# Patient Record
Sex: MALE | Race: WHITE | NOT HISPANIC OR LATINO | Employment: FULL TIME | URBAN - METROPOLITAN AREA
[De-identification: names, ages, dates, MRNs, and addresses within clinical notes are randomized per-mention and may not be internally consistent; named-entity substitution may affect disease eponyms.]

---

## 2018-01-17 ENCOUNTER — TELEPHONE (OUTPATIENT)
Dept: FAMILY MEDICINE | Facility: CLINIC | Age: 33
End: 2018-01-17

## 2018-01-17 DIAGNOSIS — Z20.828 EXPOSURE TO INFLUENZA: Primary | ICD-10-CM

## 2018-01-17 RX ORDER — OSELTAMIVIR PHOSPHATE 75 MG/1
75 CAPSULE ORAL 2 TIMES DAILY
Qty: 10 CAPSULE | Refills: 0 | Status: SHIPPED | OUTPATIENT
Start: 2018-01-17 | End: 2019-11-06

## 2018-01-17 NOTE — TELEPHONE ENCOUNTER
Ray's wife called to say that their son was diagnosed with influenza and is on tamiflu.  Ray is coming down with the same symptoms.  Wondering if he can have the tamiful, or if he needs to be seen.  He has not been seen here since 8/2016. - please advise    Pending Prescriptions:                       Disp   Refills    oseltamivir (TAMIFLU) 75 MG capsule       10 cap*0            Sig: Take 1 capsule (75 mg) by mouth 2 times daily    Patient's phone #[phone] wife Kelsey

## 2018-01-17 NOTE — TELEPHONE ENCOUNTER
I will OK this but pt should be aware we are making an exception as he has not been seen in over a year -needs OV for any further cares from this clinic in future

## 2018-06-21 ENCOUNTER — OFFICE VISIT (OUTPATIENT)
Dept: UROLOGY | Facility: CLINIC | Age: 33
End: 2018-06-21
Payer: COMMERCIAL

## 2018-06-21 VITALS
SYSTOLIC BLOOD PRESSURE: 110 MMHG | HEART RATE: 103 BPM | OXYGEN SATURATION: 98 % | WEIGHT: 132 LBS | HEIGHT: 66 IN | DIASTOLIC BLOOD PRESSURE: 76 MMHG | BODY MASS INDEX: 21.21 KG/M2

## 2018-06-21 DIAGNOSIS — Z30.2 ENCOUNTER FOR STERILIZATION: Primary | ICD-10-CM

## 2018-06-21 PROCEDURE — 99203 OFFICE O/P NEW LOW 30 MIN: CPT | Performed by: UROLOGY

## 2018-06-21 RX ORDER — INFLUENZA A VIRUS A/NEBRASKA/14/2019 (H1N1) ANTIGEN (MDCK CELL DERIVED, PROPIOLACTONE INACTIVATED), INFLUENZA A VIRUS A/DELAWARE/39/2019 (H3N2) ANTIGEN (MDCK CELL DERIVED, PROPIOLACTONE INACTIVATED), INFLUENZA B VIRUS B/SINGAPORE/INFTT-16-0610/2016 ANTIGEN (MDCK CELL DERIVED, PROPIOLACTONE INACTIVATED), INFLUENZA B VIRUS B/DARWIN/7/2019 ANTIGEN (MDCK CELL DERIVED, PROPIOLACTONE INACTIVATED) 15; 15; 15; 15 UG/.5ML; UG/.5ML; UG/.5ML; UG/.5ML
INJECTION, SUSPENSION INTRAMUSCULAR
Refills: 0 | COMMUNITY
Start: 2017-10-29 | End: 2019-11-06

## 2018-06-21 RX ORDER — NAPROXEN SODIUM 550 MG/1
TABLET ORAL
Refills: 6 | COMMUNITY
Start: 2017-07-11 | End: 2019-11-06

## 2018-06-21 RX ORDER — NORTRIPTYLINE HYDROCHLORIDE 50 MG/1
CAPSULE ORAL
Refills: 4 | COMMUNITY
Start: 2018-05-11

## 2018-06-21 ASSESSMENT — PAIN SCALES - GENERAL: PAINLEVEL: NO PAIN (0)

## 2018-06-21 NOTE — LETTER
2018       RE: Ray Ingram  [address] [facility] [address]     Dear Colleague,    Thank you for referring your patient, Ray Ingram, to the [facility] at [facility]. Please see a copy of my visit note below.    VASECTOMY CONSULTATION NOTE  [facility]  [phone]  DATE OF VISIT: 2018    PATIENT NAME: Ray Ingram    YOB: 1985      REASON FOR CONSULTATION: Mr. Ray Ingram is a 32 year old year old gentleman who came to the urology clinic today requesting a vasectomy. He has 2 children and he wishes to have a vasectomy for birth control.     PAST MEDICAL HISTORY:   Past Medical History:   Diagnosis Date     NO ACTIVE PROBLEMS        PAST SURGICAL HISTORY:   Past Surgical History:   Procedure Laterality Date     HC TOOTH EXTRACTION W/FORCEP  18    wisdom       MEDICATIONS:   Current Outpatient Prescriptions:      FLUCELVAX QUADRIVALENT 0.5 ML RICKI, ADM 0.5ML IM UTD, Disp: , Rfl: 0     naproxen sodium (ANAPROX) 550 MG tablet, TAKE 1 TABLET BY MOUTH EVERY 12 HOURS AS NEEDED, Disp: , Rfl: 6     nortriptyline (PAMELOR) 50 MG capsule, TAKE 1 CAPSULE BY MOUTH EVERYDAY AT BEDTIME, Disp: , Rfl: 4     oseltamivir (TAMIFLU) 75 MG capsule, Take 1 capsule (75 mg) by mouth 2 times daily (Patient not taking: Reported on 2018), Disp: 10 capsule, Rfl: 0    ALLERGIES:   Allergies   Allergen Reactions     Codeine Sulfate Shortness Of Breath, Nausea and GI Disturbance     Stomach cramps       FAMILY HISTORY:   Family History   Problem Relation Age of Onset     Cancer Father 52     Pancreatic cancer     Alzheimer Disease Maternal Grandmother 60      age 70     Diabetes No family hx of      HEART DISEASE No family hx of        SOCIAL HISTORY:   Social History     Social History     Marital status:      Spouse name: N/A     Number of children: 0     Years of  "education: N/A     Occupational History       [employer]      [employer]     Social History Main Topics     Smoking status: Never Smoker     Smokeless tobacco: Never Used     Alcohol use 0.5 oz/week     1 Standard drinks or equivalent per week      Comment: social     Drug use: No     Sexual activity: Yes     Partners: Female     Birth control/ protection: Condom, Pill     Other Topics Concern      Service No     Blood Transfusions No     Occupational Exposure No     Hobby Hazards No     Sleep Concern No     Weight Concern No     Special Diet No     Exercise Yes     running     Seat Belt Yes     Self-Exams Yes     Social History Narrative       HEIGHT: 5' 6\"     WEIGHT: 132 lbs 0 oz   BP: 110/76    PULSE: 103    EXAM: He is alert and oriented and well-appearing.  Examination of the scrotum reveals normal scrotal skin.  The testicles are normal to palpation bilaterally with no intratesticular lesions.  He has normally palpable vasa bilaterally.    DIAGNOSIS: Request for sterilization    PLAN: The risks of the procedure as well as expectations for recovery and outcomes were splint in detail to him.  He was counseled on the risks for bleeding infection and pain after the procedure.  He was instructed to continue to use contraception until he had proven azoospermia on a semen specimen.  This would normally be collected at least 3 months after the procedure.  He was instructed to hold all anticoagulants medications for one week prior to the procedure.  He was also instructed to shave the scrotum prior to procedure.  It was recommended that he have someone else drive him home after his vasectomy.  In light of these risks and expectations he would like to proceed.  We are scheduling a vasectomy in the office in the near future.    Rom Griffin M.D.      "

## 2018-06-21 NOTE — NURSING NOTE
Chief Complaint   Patient presents with     Clinic Care Coordination - Follow-up     Pt here for vasectomy consult     Kiersten Pedraza CMA

## 2018-06-21 NOTE — MR AVS SNAPSHOT
After Visit Summary   6/21/2018    Ray Ingram    MRN: [id number]           Patient Information     Date Of Birth          1985        Visit Information        Provider Department      6/21/2018 9:30 AM Rom Griffin MD [facility] [place]        Today's Diagnoses     Encounter for sterilization    -  1      Care Instructions    [facility]  Vasectomy Information  [phone]    DATE OF PROCEDURE _____07/13/18______________________________    TIME OF PROCEDURE ____1030am______________________    TIME TO REPORT FOR PROCEDURE _____1015am__________________________    Your Physician:  _____ Dory Mancini M.D.     _____ Camilo Vigil M.D.     _x____ Rom Griffin M.D.    LOCATION OF PROCEDURE:     _____ [facility]  __x___ [address]   [address]   [address]    [place]   [address]    [place]   [address]    Preoperative Instructions:    __x      You may have breakfast on the morning of your procedure.  If your procedure is    in the afternoon, you may have lunch as well.    __x___ You must have someone drive you home after the procedure if you have been    prescribed an oral sedative (valium).    __x___ Do not take any aspirin, blood-thinning or anti-inflammatory medication for at    least 7-10 days before the procedure (this includes but is not limited to Advil,   Aleve, Ecotrin and Bufferin).      Postoperative Instructions Follow Vasectomy    Under routine circumstances, please note the following:    -No heavy lifting (over 15 lbs) for 48 hour.  -You may shower after 48 hours.  You may have a tub bath or use a swimming pool after one week postoperatively.  Your doctor will advise you if he feels it is helpful to soak in a bathtub postoperatively.  -Do not engage in intercourse for at least ten days and then proceed when comfortable.  -Wear an athletic supporter for  "48 hours postoperatively or until any discomfort ceases.  -Your physician will instruct you regarding the use of ice in the recovery room or at home after the procedure, as necessary.  -Please remember as you resume your activity that you may experience some discomfor and/or swelling for the one to two weeks following the procedure.  If this occurs decrease activity and slightly elevate the scrotum (athletic supporter).  -As your stitches dissolve it may appear that the incision is \"gaping.\"  This is normal.    Necessary follow-up:  You do not need a follow up appointment unless you have problems after your procedure.  Please call our office at [phone] if you do.    At the time of your procedure you will be given the supplies for your post procedure follow up.  The test should be done AT LEAST THREE MONTHS AFTER your vasecomy.  During this time, be certain to maintain birth control measure!    Between the time of your procedure and the time that you have your first sperm count it is very important that you have a minimum of 30 ejaculations.  If you have any questions about this, please consult your physician.    If the sperm count that is done at three months is negative, you will be considered sterile.  Until, you are told that you are free to discontinue birth control you are considered fertile.    If your sperm counts reveal the presence of sperm, you will be advised to repeat the test until a negative result is obtained.     NOTE:  Please call our office one week after dropping off your sample for the result.  Test results will only be given to the patient.               Follow-ups after your visit        Follow-up notes from your care team     Return for Schedule vasectomy in office.      Your next 10 appointments already scheduled     Jul 13, 2018 10:30 AM CDT   Vasectomy with Rom Griffin MD   [facility] [place] ([facility])    [address] [facility]  [address]  [facility] [id number] [phone]              Who to contact     If you have questions or need follow up information about today's clinic visit or your schedule please contact [facility] directly at [phone].  Normal or non-critical lab and imaging results will be communicated to you by MyChart, letter or phone within 4 business days after the clinic has received the results. If you do not hear from us within 7 days, please contact the clinic through MyChart or phone. If you have a critical or abnormal lab result, we will notify you by phone as soon as possible.  Submit refill requests through [employer] or call your pharmacy and they will forward the refill request to us. Please allow 3 business days for your refill to be completed.          Additional Information About Your Visit        MyChart Information     [employer] lets you send messages to your doctor, view your test results, renew your prescriptions, schedule appointments and more. To sign up, go to [email or web address] . Click on \"Log in\" on the left side of the screen, which will take you to the Welcome page. Then click on \"Sign up Now\" on the right side of the page.     You will be asked to enter the access code listed below, as well as some personal information. Please follow the directions to create your username and password.     Your access code is: [email or web address]  Expires: 2018  9:53 AM     Your access code will  in 90 days. If you need help or a new code, please call your [facility] clinic or [phone].        Care EveryWhere ID     This is your Care EveryWhere ID. This could be used by other organizations to access your [facility] medical records  [id number]        Your Vitals Were     Pulse Height Pulse Oximetry BMI (Body Mass Index)          103 1.676 m (5' 6\") 98% 21.31 kg/m2         Blood Pressure from Last 3 Encounters:   18 110/76   16 100/68 "   11/06/14 100/60    Weight from Last 3 Encounters:   06/21/18 59.9 kg (132 lb)   01/07/16 62.7 kg (138 lb 3.2 oz)   11/06/14 63 kg (139 lb)              Today, you had the following     No orders found for display       Primary Care Provider Office Phone # Fax #    Mari Carroll [phone] [address] [facility]  [address]  [facility] [address]        Equal Access to Services     RAUL ORTIZ : Hadii aad ku hadasho Soomaali, waaxda luqadaha, qaybta kaalmada adeegyada, waxay idiin hayaan adeeg kharash lahilario . So [facility] [phone].    ATENCIÓN: Si habla español, tiene a antoine disposición servicios gratuitos de asistencia lingüística. Llame al [phone].    We comply with applicable federal civil rights laws and [place] laws. We do not discriminate on the basis of race, color, national origin, age, disability, sex, sexual orientation, or gender identity.            Thank you!     Thank you for choosing [facility] [place]  for your care. Our goal is always to provide you with excellent care. Hearing back from our patients is one way we can continue to improve our services. Please take a few minutes to complete the written survey that you may receive in the mail after your visit with us. Thank you!             Your Updated Medication List - Protect others around you: Learn how to safely use, store and throw away your medicines at www.disposemymeds.org.          This list is accurate as of 6/21/18  9:56 AM.  Always use your most recent med list.                   Brand Name Dispense Instructions for use Diagnosis    FLUCELVAX QUADRIVALENT 0.5 ML [facility]ita   Generic drug:  Influenza Vac Subunit Quad      ADM 0.5ML IM UTD        naproxen sodium 550 MG tablet    ANAPROX     TAKE 1 TABLET BY MOUTH EVERY 12 HOURS AS NEEDED        nortriptyline 50 MG capsule    PAMELOR     TAKE 1 CAPSULE BY MOUTH EVERYDAY AT BEDTIME        oseltamivir 75 MG capsule    TAMIFLU    10 capsule     Take 1 capsule (75 mg) by mouth 2 times daily    Exposure to influenza

## 2018-06-21 NOTE — PATIENT INSTRUCTIONS
[facility]  Vasectomy Information  [phone]    DATE OF PROCEDURE _____07/13/18______________________________    TIME OF PROCEDURE ____1030am______________________    TIME TO REPORT FOR PROCEDURE _____1015am__________________________    Your Physician:  _____ Dory Mancini M.D.     _____ Camilo Vigil M.D.     _x____ Rom Griffin M.D.    LOCATION OF PROCEDURE:     _____ [facility]  __x___ [address]   [address]   [address] [facility]  [address]    [place]   [address]    [place]   [address]    Preoperative Instructions:    __x      You may have breakfast on the morning of your procedure.  If your procedure is    in the afternoon, you may have lunch as well.    __x___ You must have someone drive you home after the procedure if you have been    prescribed an oral sedative (valium).    __x___ Do not take any aspirin, blood-thinning or anti-inflammatory medication for at    least 7-10 days before the procedure (this includes but is not limited to Advil,   Aleve, Ecotrin and Bufferin).      Postoperative Instructions Follow Vasectomy    Under routine circumstances, please note the following:    -No heavy lifting (over 15 lbs) for 48 hour.  -You may shower after 48 hours.  You may have a tub bath or use a swimming pool after one week postoperatively.  Your doctor will advise you if he feels it is helpful to soak in a bathtub postoperatively.  -Do not engage in intercourse for at least ten days and then proceed when comfortable.  -Wear an athletic supporter for 48 hours postoperatively or until any discomfort ceases.  -Your physician will instruct you regarding the use of ice in the recovery room or at home after the procedure, as necessary.  -Please remember as you resume your activity that you may experience some discomfor and/or swelling for the one to two weeks following the procedure.  If this occurs decrease activity and slightly elevate the  "scrotum (athletic supporter).  -As your stitches dissolve it may appear that the incision is \"gaping.\"  This is normal.    Necessary follow-up:  You do not need a follow up appointment unless you have problems after your procedure.  Please call our office at [phone] if you do.    At the time of your procedure you will be given the supplies for your post procedure follow up.  The test should be done AT LEAST THREE MONTHS AFTER your vasecomy.  During this time, be certain to maintain birth control measure!    Between the time of your procedure and the time that you have your first sperm count it is very important that you have a minimum of 30 ejaculations.  If you have any questions about this, please consult your physician.    If the sperm count that is done at three months is negative, you will be considered sterile.  Until, you are told that you are free to discontinue birth control you are considered fertile.    If your sperm counts reveal the presence of sperm, you will be advised to repeat the test until a negative result is obtained.     NOTE:  Please call our office one week after dropping off your sample for the result.  Test results will only be given to the patient.       "

## 2018-06-21 NOTE — PROGRESS NOTES
VASECTOMY CONSULTATION NOTE  [facility]  [phone]  DATE OF VISIT: 2018    PATIENT NAME: Ray Ingram    YOB: 1985      REASON FOR CONSULTATION: Mr. Ray Ingram is a 32 year old year old gentleman who came to the urology clinic today requesting a vasectomy. He has 2 children and he wishes to have a vasectomy for birth control.     PAST MEDICAL HISTORY:   Past Medical History:   Diagnosis Date     NO ACTIVE PROBLEMS        PAST SURGICAL HISTORY:   Past Surgical History:   Procedure Laterality Date     HC TOOTH EXTRACTION W/FORCEP  18    wisdom       MEDICATIONS:   Current Outpatient Prescriptions:      FLUCELVAX QUADRIVALENT 0.5 ML RICKI, ADM 0.5ML IM UTD, Disp: , Rfl: 0     naproxen sodium (ANAPROX) 550 MG tablet, TAKE 1 TABLET BY MOUTH EVERY 12 HOURS AS NEEDED, Disp: , Rfl: 6     nortriptyline (PAMELOR) 50 MG capsule, TAKE 1 CAPSULE BY MOUTH EVERYDAY AT BEDTIME, Disp: , Rfl: 4     oseltamivir (TAMIFLU) 75 MG capsule, Take 1 capsule (75 mg) by mouth 2 times daily (Patient not taking: Reported on 2018), Disp: 10 capsule, Rfl: 0    ALLERGIES:   Allergies   Allergen Reactions     Codeine Sulfate Shortness Of Breath, Nausea and GI Disturbance     Stomach cramps       FAMILY HISTORY:   Family History   Problem Relation Age of Onset     Cancer Father 52     Pancreatic cancer     Alzheimer Disease Maternal Grandmother 60      age 70     Diabetes No family hx of      HEART DISEASE No family hx of        SOCIAL HISTORY:   Social History     Social History     Marital status:      Spouse name: N/A     Number of children: 0     Years of education: N/A     Occupational History      [employer]      [employer]     Social History Main Topics     Smoking status: Never Smoker     Smokeless tobacco: Never Used     Alcohol use 0.5 oz/week     1 Standard drinks or equivalent per week      Comment: social     Drug use: No     Sexual activity: Yes      "Partners: Female     Birth control/ protection: Condom, Pill     Other Topics Concern      Service No     Blood Transfusions No     Occupational Exposure No     Hobby Hazards No     Sleep Concern No     Weight Concern No     Special Diet No     Exercise Yes     running     Seat Belt Yes     Self-Exams Yes     Social History Narrative       HEIGHT: 5' 6\"     WEIGHT: 132 lbs 0 oz   BP: 110/76    PULSE: 103    EXAM: He is alert and oriented and well-appearing.  Examination of the scrotum reveals normal scrotal skin.  The testicles are normal to palpation bilaterally with no intratesticular lesions.  He has normally palpable vasa bilaterally.    DIAGNOSIS: Request for sterilization    PLAN: The risks of the procedure as well as expectations for recovery and outcomes were splint in detail to him.  He was counseled on the risks for bleeding infection and pain after the procedure.  He was instructed to continue to use contraception until he had proven azoospermia on a semen specimen.  This would normally be collected at least 3 months after the procedure.  He was instructed to hold all anticoagulants medications for one week prior to the procedure.  He was also instructed to shave the scrotum prior to procedure.  It was recommended that he have someone else drive him home after his vasectomy.  In light of these risks and expectations he would like to proceed.  We are scheduling a vasectomy in the office in the near future.    Rom Griffin M.D.    "

## 2018-07-05 DIAGNOSIS — Z30.2 ENCOUNTER FOR STERILIZATION: Primary | ICD-10-CM

## 2018-07-13 ENCOUNTER — OFFICE VISIT (OUTPATIENT)
Dept: UROLOGY | Facility: CLINIC | Age: 33
End: 2018-07-13
Payer: COMMERCIAL

## 2018-07-13 VITALS — OXYGEN SATURATION: 98 % | HEIGHT: 66 IN | BODY MASS INDEX: 21.21 KG/M2 | WEIGHT: 132 LBS | HEART RATE: 96 BPM

## 2018-07-13 DIAGNOSIS — Z30.2 ENCOUNTER FOR STERILIZATION: Primary | ICD-10-CM

## 2018-07-13 PROCEDURE — 88302 TISSUE EXAM BY PATHOLOGIST: CPT | Performed by: UROLOGY

## 2018-07-13 PROCEDURE — 55250 REMOVAL OF SPERM DUCT(S): CPT | Performed by: UROLOGY

## 2018-07-13 RX ORDER — HYDROCODONE BITARTRATE AND ACETAMINOPHEN 5; 325 MG/1; MG/1
1 TABLET ORAL EVERY 6 HOURS PRN
Qty: 5 TABLET | Refills: 0 | Status: SHIPPED | OUTPATIENT
Start: 2018-07-13 | End: 2019-11-06

## 2018-07-13 ASSESSMENT — PAIN SCALES - GENERAL: PAINLEVEL: NO PAIN (0)

## 2018-07-13 NOTE — PATIENT INSTRUCTIONS
POST VASECTOMY INSTRUCTIONS    1.) If you have any concerns or questions, please contact our office at [phone].     2.) It is okay to take a shower, however, do not soak in water (bath,swimming, hot tub,etc....) until your incision is healed.    3.) You might notice some swelling, mild bruising, and discomfort for several days after your vasectomy. This is to be expected. For at least the next 24 hours, an ice pack should be applied for 20 minutes every hour that you are awake. Ice will help with discomfort and swelling. Do not place directly on the skin.    4.) No intercourse, strenuous activity or exercise for at least 7-10 days, even if you feel fine.    5.) You need to wear good scrotal support while you are healing. We strongly recommend an athletic supporter or a pair of regular briefs that are one size too small. Boxer briefs do not offer enough support.    6.) Tylenol as directed on the bottle is preferred for discomfort. Please avoid any blood thinning products such as ibuprofen and aspirin (Motrin, Advil, Excedrin, Aleve, ect..) for at least the next week.    7.) It is normal to have mild drainage from the incision area for several days. However, please contact our office if you notice: bright red blood that does not stop after three days, increased pain, heat at the incision, red streaks, foul smelling discharge, or if you start to run a fever.     8.) YOU MUST CONTINUE BIRTH CONTROL UNTIL WE CONFIRM YOUR STERILITY.  This process can take up to a year to complete (rare occurrence).     9.) You have been given a form with specimen cup and instructions for your follow up specimen. You will be cleared once we receive ONE negative specimen. If your specimen comes back positive (sperm seen) you will be asked to repeat the test. This does not mean that your vasectomy has failed.

## 2018-07-13 NOTE — PROGRESS NOTES
OFFICE VASECTOMY OPERATIVE NOTE  [facility]  ([phone]    DATE: 07/13/18  PATIENT: Ray Ingram    YOB: 1985    Ray Ingram is a 32 year old male.  He has 2 children and he wishes a vasectomy for birth control.  He has read the brochure and he has shaved himself.  I reviewed the vasectomy procedure with him explaining that it would be done with a local anesthetic given just in the location where the vasectomy would be done.  It would be done through scalpel-less incisions with the removal of segments of the vasa, cauterization of the ends, and burying the ends separate with sutures.      Pt. Understands:  1/1000-1/3000 risk of future pregnancy even with perfectly done vasectomy  -vasectomy is a permanent procedure    -he may cryopreserve sperm if he wishes   -1-5% risk of post-vasectomy pain syndrome   -1-5% risk of complication, primarily infection or bleeding  - he needs to have a semen sample that shows no sperm before getting approval for unprotected intercourse.      Complications such as bleeding, infection, and damage to other tissues in the area were discussed.  I recommended that an ice bag be placed on the scrotum off and on tonight to help reduce pain and swelling.      He was reminded that he was not sterile immediately after the vasectomy that it would take at least 20 ejaculations to empty the vas of any remaining sperm.  He was not to provide a semen sample until after the 20th ejaculation and not before 12 weeks after the vas. He was  to fulfill both of those requirements.   He understands it is his responsibility to find out the results of the vas before proceeding with intercourse without birth control protection.  Other items discussed were activity afterwards, returning to work, voluntary physical activity,  resuming sexual activity, clothing to wear, bathing, and care of the vas site and expected changes in the site as healing progresses.  After  signing the permit, bilateral vasectomy was done as described through scalpel-less incisions.       ANESTHESIA: Local    DETAILS OF PROCEDURE: The risks of the procedure were explained in detail to the patient and informed consent was obtained. The patient was placed supine on the procedure table and the penis and scrotum were prepped and draped in the standard sterile fashion. The right vas deferens was isolated and brought up to the median raphe of the scrotum. 1% lidocaine local anesthesia was used to infiltrate the skin and the spermatic cord. A sharp hemostat was used to make a skin puncture. Adventitial tissues were swept away from the vas. A 1 cm segment of the vas was excised and sent for pathology. The proximal and distal lumina of the vas were cauterized and then each segment was tied off in a knuckling-fashion with a 3-0 chromic suture. Hemostasis was ensured and the segments were released back into the scrotum. Next the left vas was brought up to the same incision and a vasectomy was performed in the similar fashion. At the end of the procedure a single 3-0 chromic suture was placed in the skin.     COMPLICATIONS: None    TAKE HOME MEDICATIONS: Vicodin, 1-2 tabs every 6 hours, PRN    DISMISSAL INSTRUCTIONS:  - Ice pack to scrotum 15 to 20 minutes each hour awake for 36 to 40 hours.  - No strenuous activity or ejaculation for 14 days.  - No unprotected sexual activity until proven azoospermia on semen samples at 3 months.  - Referred to patient handout for normal postop expectations and indications to contact nurse or physician.    M.D.: Rom Griffin M.D.

## 2018-07-13 NOTE — MR AVS SNAPSHOT
After Visit Summary   7/13/2018    Ray Ingram    MRN: [id number]           Patient Information     Date Of Birth          1985        Visit Information        Provider Department      7/13/2018 10:30 AM Rom Griffin MD [facility] [place]        Today's Diagnoses     Encounter for sterilization    -  1      Care Instructions    POST VASECTOMY INSTRUCTIONS    1.) If you have any concerns or questions, please contact our office at [phone].     2.) It is okay to take a shower, however, do not soak in water (bath,swimming, hot tub,etc....) until your incision is healed.    3.) You might notice some swelling, mild bruising, and discomfort for several days after your vasectomy. This is to be expected. For at least the next 24 hours, an ice pack should be applied for 20 minutes every hour that you are awake. Ice will help with discomfort and swelling. Do not place directly on the skin.    4.) No intercourse, strenuous activity or exercise for at least 7-10 days, even if you feel fine.    5.) You need to wear good scrotal support while you are healing. We strongly recommend an athletic supporter or a pair of regular briefs that are one size too small. Boxer briefs do not offer enough support.    6.) Tylenol as directed on the bottle is preferred for discomfort. Please avoid any blood thinning products such as ibuprofen and aspirin (Motrin, Advil, Excedrin, Aleve, ect..) for at least the next week.    7.) It is normal to have mild drainage from the incision area for several days. However, please contact our office if you notice: bright red blood that does not stop after three days, increased pain, heat at the incision, red streaks, foul smelling discharge, or if you start to run a fever.     8.) YOU MUST CONTINUE BIRTH CONTROL UNTIL WE CONFIRM YOUR STERILITY.  This process can take up to a year to complete (rare occurrence).     9.) You have  "been given a form with specimen cup and instructions for your follow up specimen. You will be cleared once we receive ONE negative specimen. If your specimen comes back positive (sperm seen) you will be asked to repeat the test. This does not mean that your vasectomy has failed.              Follow-ups after your visit        Who to contact     If you have questions or need follow up information about today's clinic visit or your schedule please contact [facility] [place] directly at [phone].  Normal or non-critical lab and imaging results will be communicated to you by Secucloudhart, letter or phone within 4 business days after the clinic has received the results. If you do not hear from us within 7 days, please contact the clinic through Secucloudhart or phone. If you have a critical or abnormal lab result, we will notify you by phone as soon as possible.  Submit refill requests through Get-n-Post or call your pharmacy and they will forward the refill request to us. Please allow 3 business days for your refill to be completed.          Additional Information About Your Visit        Get-n-Post Information     Get-n-Post lets you send messages to your doctor, view your test results, renew your prescriptions, schedule appointments and more. To sign up, go to [email or web address] . Click on \"Log in\" on the left side of the screen, which will take you to the Welcome page. Then click on \"Sign up Now\" on the right side of the page.     You will be asked to enter the access code listed below, as well as some personal information. Please follow the directions to create your username and password.     Your access code is: [email or web address]  Expires: 2018  9:53 AM     Your access code will  in 90 days. If you need help or a new code, please call your [place] clinic or [phone].        Care EveryWhere ID     This is your Care EveryWhere ID. This could be used by other organizations to " "access your [place] medical records  [id number]        Your Vitals Were     Pulse Height Pulse Oximetry BMI (Body Mass Index)          96 1.676 m (5' 6\") 98% 21.31 kg/m2         Blood Pressure from Last 3 Encounters:   06/21/18 110/76   01/07/16 100/68   11/06/14 100/60    Weight from Last 3 Encounters:   07/13/18 59.9 kg (132 lb)   06/21/18 59.9 kg (132 lb)   01/07/16 62.7 kg (138 lb 3.2 oz)              Today, you had the following     No orders found for display         Today's Medication Changes          These changes are accurate as of 7/13/18 11:02 AM.  If you have any questions, ask your nurse or doctor.               Start taking these medicines.        Dose/Directions    HYDROcodone-acetaminophen 5-325 MG per tablet   Commonly known as:  NORCO   Used for:  Encounter for sterilization   Started by:  Rom Griffin MD        Dose:  1 tablet   Take 1 tablet by mouth every 6 hours as needed   Quantity:  5 tablet   Refills:  0            Where to get your medicines      Some of these will need a paper prescription and others can be bought over the counter.  Ask your nurse if you have questions.     Bring a paper prescription for each of these medications     HYDROcodone-acetaminophen 5-325 MG per tablet               Information about OPIOIDS     PRESCRIPTION OPIOIDS: WHAT YOU NEED TO KNOW   We gave you an opioid (narcotic) pain medicine. It is important to manage your pain, but opioids are not always the best choice. You should first try all the other options your care team gave you. Take this medicine for as short a time (and as few doses) as possible.     These medicines have risks:    DO NOT drive when on new or higher doses of pain medicine. These medicines can affect your alertness and reaction times, and you could be arrested for driving under the influence (DUI). If you need to use opioids long-term, talk to your care team about driving.    DO NOT operate heave machinery    DO NOT do any " other dangerous activities while taking these medicines.     DO NOT drink any alcohol while taking these medicines.      If the opioid prescribed includes acetaminophen, DO NOT take with any other medicines that contain acetaminophen. Read all labels carefully. Look for the word  acetaminophen  or  Tylenol.  Ask your pharmacist if you have questions or are unsure.    You can get addicted to pain medicines, especially if you have a history of addiction (chemical, alcohol or substance dependence). Talk to your care team about ways to reduce this risk.    Store your pills in a secure place, locked if possible. We will not replace any lost or stolen medicine. If you don t finish your medicine, please throw away (dispose) as directed by your pharmacist. The [place] Pollution Control Agency has more information about safe disposal: https://www.pca.[facility].[place].us/living-green/managing-unwanted-medications.     All opioids tend to cause constipation. Drink plenty of water and eat foods that have a lot of fiber, such as fruits, vegetables, prune juice, apple juice and high-fiber cereal. Take a laxative (Miralax, milk of magnesia, Colace, Senna) if you don t move your bowels at least every other day.          Primary Care Provider Office Phone # Fax #    aMri Carroll [phone] [address]  [address] [place] [phone]        Equal Access to Services     RAUL ORTIZ : Hadii shahana kelly hadasho Soomaali, waaxda luqadaha, qaybta kaalmada adeegyada, twila cook hayyvette maciel. So [facility] [phone].    ATENCIÓN: Si habla español, tiene a antoine disposición servicios gratuitos de asistencia lingüística. Llame al [phone].    We comply with applicable federal civil rights laws and [place] laws. We do not discriminate on the basis of race, color, national origin, age, disability, sex, sexual orientation, or gender identity.            Thank you!     Thank you for choosing [facility]  [facility] [place]  for your care. Our goal is always to provide you with excellent care. Hearing back from our patients is one way we can continue to improve our services. Please take a few minutes to complete the written survey that you may receive in the mail after your visit with us. Thank you!             Your Updated Medication List - Protect others around you: Learn how to safely use, store and throw away your medicines at www.disposemymeds.org.          This list is accurate as of 7/13/18 11:02 AM.  Always use your most recent med list.                   Brand Name Dispense Instructions for use Diagnosis    FLUCELVAX QUADRIVALENT 0.5 ML Emerita   Generic drug:  Influenza Vac Subunit Quad      ADM 0.5ML IM UTD        HYDROcodone-acetaminophen 5-325 MG per tablet    NORCO    5 tablet    Take 1 tablet by mouth every 6 hours as needed    Encounter for sterilization       naproxen sodium 550 MG tablet    ANAPROX     TAKE 1 TABLET BY MOUTH EVERY 12 HOURS AS NEEDED        nortriptyline 50 MG capsule    PAMELOR     TAKE 1 CAPSULE BY MOUTH EVERYDAY AT BEDTIME        oseltamivir 75 MG capsule    TAMIFLU    10 capsule    Take 1 capsule (75 mg) by mouth 2 times daily    Exposure to influenza

## 2018-07-13 NOTE — NURSING NOTE
Prior to the start of the procedure and with procedural staff participation, I verbally confirmed the patient s identity using two indicators, relevant allergies, that the procedure was appropriate and matched the consent or emergent situation, and that the correct equipment/implants were available. Immediately prior to starting the procedure I conducted the Time Out with the procedural staff and re-confirmed the patient s name, procedure, and site/side. (The Joint Commission universal protocol was followed.)  Yes    Sedation (Moderate or Deep): None  Pt has signed the consent form today confirming that a VASECTOMY is the correct procedure today. I verbally confirmed the patient s identity using two indicators, that the pt has started any medication as prescribed for this procedure, relevant allergies, that they have not used blood thinning products in the last 7 - 10 days, and that the correct equipment was available. Immediately prior to starting the procedure I conducted the Time Out with the MD and re-confirmed the patient s name and procedure. Pathology ordered and sent to lab. Post-procedure information, also specimen collection cup with instructions, given to pt at time of check out.    DENVER Barragan CMA

## 2018-07-13 NOTE — LETTER
7/13/2018       RE: Ray Ingram  [address] [facility] [address]     Dear Colleague,    Thank you for referring your patient, Ray Ingram, to the [facility] [place] at [facility]. Please see a copy of my visit note below.    OFFICE VASECTOMY OPERATIVE NOTE  [facility]  ([phone]    DATE: 07/13/18  PATIENT: Ray Ingram    YOB: 1985    Ray Ingram is a 32 year old male.  He has 2 children and he wishes a vasectomy for birth control.  He has read the brochure and he has shaved himself.  I reviewed the vasectomy procedure with him explaining that it would be done with a local anesthetic given just in the location where the vasectomy would be done.  It would be done through scalpel-less incisions with the removal of segments of the vasa, cauterization of the ends, and burying the ends separate with sutures.      Pt. Understands:  1/1000-1/3000 risk of future pregnancy even with perfectly done vasectomy  -vasectomy is a permanent procedure    -he may cryopreserve sperm if he wishes   -1-5% risk of post-vasectomy pain syndrome   -1-5% risk of complication, primarily infection or bleeding  - he needs to have a semen sample that shows no sperm before getting approval for unprotected intercourse.      Complications such as bleeding, infection, and damage to other tissues in the area were discussed.  I recommended that an ice bag be placed on the scrotum off and on tonight to help reduce pain and swelling.      He was reminded that he was not sterile immediately after the vasectomy that it would take at least 20 ejaculations to empty the vas of any remaining sperm.  He was not to provide a semen sample until after the 20th ejaculation and not before 12 weeks after the vas. He was  to fulfill both of those requirements.   He understands it is his responsibility to find out the  results of the vas before proceeding with intercourse without birth control protection.  Other items discussed were activity afterwards, returning to work, voluntary physical activity,  resuming sexual activity, clothing to wear, bathing, and care of the vas site and expected changes in the site as healing progresses.  After signing the permit, bilateral vasectomy was done as described through scalpel-less incisions.       ANESTHESIA: Local    DETAILS OF PROCEDURE: The risks of the procedure were explained in detail to the patient and informed consent was obtained. The patient was placed supine on the procedure table and the penis and scrotum were prepped and draped in the standard sterile fashion. The right vas deferens was isolated and brought up to the median raphe of the scrotum. 1% lidocaine local anesthesia was used to infiltrate the skin and the spermatic cord. A sharp hemostat was used to make a skin puncture. Adventitial tissues were swept away from the vas. A 1 cm segment of the vas was excised and sent for pathology. The proximal and distal lumina of the vas were cauterized and then each segment was tied off in a knuckling-fashion with a 3-0 chromic suture. Hemostasis was ensured and the segments were released back into the scrotum. Next the left vas was brought up to the same incision and a vasectomy was performed in the similar fashion. At the end of the procedure a single 3-0 chromic suture was placed in the skin.     COMPLICATIONS: None    TAKE HOME MEDICATIONS: Vicodin, 1-2 tabs every 6 hours, PRN    DISMISSAL INSTRUCTIONS:  - Ice pack to scrotum 15 to 20 minutes each hour awake for 36 to 40 hours.  - No strenuous activity or ejaculation for 14 days.  - No unprotected sexual activity until proven azoospermia on semen samples at 3 months.  - Referred to patient handout for normal postop expectations and indications to contact nurse or physician.    M.D.: Rom Griffin M.D.

## 2018-07-17 LAB — COPATH REPORT: NORMAL

## 2018-10-31 DIAGNOSIS — Z30.2 ENCOUNTER FOR STERILIZATION: ICD-10-CM

## 2018-10-31 LAB — SEMEN ANALYSIS P VAS PNL: NORMAL

## 2018-10-31 PROCEDURE — 89321 SEMEN ANAL SPERM DETECTION: CPT | Performed by: UROLOGY

## 2019-11-06 ENCOUNTER — OFFICE VISIT (OUTPATIENT)
Dept: FAMILY MEDICINE | Facility: CLINIC | Age: 34
End: 2019-11-06

## 2019-11-06 VITALS
TEMPERATURE: 98.1 F | DIASTOLIC BLOOD PRESSURE: 76 MMHG | BODY MASS INDEX: 22.11 KG/M2 | SYSTOLIC BLOOD PRESSURE: 110 MMHG | HEART RATE: 101 BPM | WEIGHT: 137.6 LBS | HEIGHT: 66 IN | OXYGEN SATURATION: 99 %

## 2019-11-06 DIAGNOSIS — J20.9 ACUTE BRONCHITIS, UNSPECIFIED ORGANISM: Primary | ICD-10-CM

## 2019-11-06 DIAGNOSIS — Z71.89 ACP (ADVANCE CARE PLANNING): ICD-10-CM

## 2019-11-06 PROCEDURE — 99202 OFFICE O/P NEW SF 15 MIN: CPT | Performed by: PHYSICIAN ASSISTANT

## 2019-11-06 RX ORDER — AZITHROMYCIN 250 MG/1
TABLET, FILM COATED ORAL
Qty: 6 TABLET | Refills: 0 | Status: SHIPPED | OUTPATIENT
Start: 2019-11-06 | End: 2022-04-15

## 2019-11-06 RX ORDER — NAPROXEN 500 MG/1
TABLET ORAL
COMMUNITY
Start: 2019-10-29

## 2019-11-06 ASSESSMENT — MIFFLIN-ST. JEOR: SCORE: 1488.96

## 2019-11-06 NOTE — NURSING NOTE
Wes is here today for a cough.    Pre-visit Screening:  Immunizations:  up to date  Colonoscopy:  NA  Mammogram: NA  Asthma Action Test/Plan:  NA  PHQ9:  PHQ 2 done today  GAD7:  NA  Questioned patient about current smoking habits Pt. has never smoked.  Ok to leave detailed message on voice mail for today's visit only Yes, phone # [phone]

## 2019-11-06 NOTE — PATIENT INSTRUCTIONS
Most consistent with acute bronchitis, possible paired with cough-related reflux. Given duration of symptoms, may be bacterial. Recommended pt complete a z-pack (azithromycin). Take with food. Warned of side effects including upset stomach, loose stool, rash, and to stop medication and contact me if side effects noted. Okay to continue using OTC cough suppressants as needed.     Also advised antacid (ex Pepto Bismol) otherwise could consider Pepcid AC instead.     Contact me in 1 week if not significantly better, or sooner with worsening.

## 2019-11-06 NOTE — PROGRESS NOTES
"CC: Cough    History:  Ray is here today with a persistent cough that started 3 weeks ago. Cough is non-productive day and night, better in the morning, but worse over course of the day. Has been sleeping okay. No fever, but occasional sweats and chills. Does feel like breathing takes more effort, but no wheezing.     Has been taking Dayquil/Nyquil with temporary relief.     No history of asthma or pneumonia, but kids have dealt with this. No known exposures.     PMH, MEDICATIONS, ALLERGIES, SOCIAL AND FAMILY HISTORY in [facility] and reviewed by me personally.    ROS negative other than the symptoms noted above in the HPI.      Examination   /76 (BP Location: Right arm, Patient Position: Sitting, Cuff Size: Adult Regular)   Pulse 101   Temp 98.1  F (36.7  C) (Oral)   Ht 1.664 m (5' 5.5\")   Wt 62.4 kg (137 lb 9.6 oz)   SpO2 99%   BMI 22.55 kg/m       Constitutional: Sitting comfortably, in no acute distress. Vital signs noted  Eyes: pupils equal round reactive to light and accomodation, extra ocular movements intact  Ears: external canals and TMs free of abnormalities  Nose: patent, without mucosal abnormalities  Mouth and throat: without erythema or lesions of the mucosa  Neck:  no adenopathy, trachea midline and normal to palpation  Cardiovascular:  regular rate and rhythm, no murmurs, clicks, or gallops  Respiratory:  normal respiratory rate and rhythm, lungs clear to auscultation  SKIN: No jaundice/pallor/rash.   Psychiatric: mentation appears normal and affect normal/bright        A/P    ICD-10-CM    1. Acute bronchitis, unspecified organism J20.9 azithromycin (ZITHROMAX) 250 MG tablet   2. ACP (advance care planning) Z71.89        DISCUSSION:  Most consistent with acute bronchitis, possible paired with cough-related reflux causing throat discomfort. Given duration of symptoms, may be bacterial. Recommended pt complete a z-pack (azithromycin). Take with food. Warned of side effects including upset " stomach, loose stool, rash, and to stop medication and contact me if side effects noted. Okay to continue using OTC cough suppressants as needed.     Also advised antacid (ex Pepto Bismol) otherwise could consider Pepcid AC instead.     Contact me in 1 week if not significantly better, or sooner with worsening.     follow up visit: As needed    [facility]

## 2022-04-15 ENCOUNTER — OFFICE VISIT (OUTPATIENT)
Dept: FAMILY MEDICINE | Facility: CLINIC | Age: 37
End: 2022-04-15

## 2022-04-15 VITALS
DIASTOLIC BLOOD PRESSURE: 84 MMHG | OXYGEN SATURATION: 98 % | WEIGHT: 132 LBS | SYSTOLIC BLOOD PRESSURE: 118 MMHG | HEART RATE: 88 BPM | HEIGHT: 66 IN | TEMPERATURE: 98.1 F | RESPIRATION RATE: 22 BRPM | BODY MASS INDEX: 21.21 KG/M2

## 2022-04-15 DIAGNOSIS — Z13.1 SCREENING FOR DIABETES MELLITUS: ICD-10-CM

## 2022-04-15 DIAGNOSIS — Z13.220 LIPID SCREENING: ICD-10-CM

## 2022-04-15 DIAGNOSIS — F41.9 ANXIETY: ICD-10-CM

## 2022-04-15 DIAGNOSIS — Z00.00 ANNUAL PHYSICAL EXAM: Primary | ICD-10-CM

## 2022-04-15 DIAGNOSIS — E55.9 VITAMIN D DEFICIENCY: ICD-10-CM

## 2022-04-15 LAB
CHOLEST SERPL-MCNC: 239 MG/DL (ref 0–199)
CHOLEST/HDLC SERPL: 3 {RATIO} (ref 0–5)
GLUCOSE SERPL-MCNC: 81 MG/DL (ref 60–99)
HDLC SERPL-MCNC: 69 MG/DL (ref 40–150)
LDLC SERPL CALC-MCNC: 151 MG/DL (ref 0–130)
TRIGL SERPL-MCNC: 96 MG/DL (ref 0–149)

## 2022-04-15 PROCEDURE — 36415 COLL VENOUS BLD VENIPUNCTURE: CPT | Performed by: STUDENT IN AN ORGANIZED HEALTH CARE EDUCATION/TRAINING PROGRAM

## 2022-04-15 PROCEDURE — 99395 PREV VISIT EST AGE 18-39: CPT | Performed by: STUDENT IN AN ORGANIZED HEALTH CARE EDUCATION/TRAINING PROGRAM

## 2022-04-15 PROCEDURE — 82947 ASSAY GLUCOSE BLOOD QUANT: CPT | Performed by: STUDENT IN AN ORGANIZED HEALTH CARE EDUCATION/TRAINING PROGRAM

## 2022-04-15 PROCEDURE — 80061 LIPID PANEL: CPT | Performed by: STUDENT IN AN ORGANIZED HEALTH CARE EDUCATION/TRAINING PROGRAM

## 2022-04-15 ASSESSMENT — ANXIETY QUESTIONNAIRES
2. NOT BEING ABLE TO STOP OR CONTROL WORRYING: SEVERAL DAYS
5. BEING SO RESTLESS THAT IT IS HARD TO SIT STILL: NOT AT ALL
7. FEELING AFRAID AS IF SOMETHING AWFUL MIGHT HAPPEN: NOT AT ALL
1. FEELING NERVOUS, ANXIOUS, OR ON EDGE: SEVERAL DAYS
IF YOU CHECKED OFF ANY PROBLEMS ON THIS QUESTIONNAIRE, HOW DIFFICULT HAVE THESE PROBLEMS MADE IT FOR YOU TO DO YOUR WORK, TAKE CARE OF THINGS AT HOME, OR GET ALONG WITH OTHER PEOPLE: SOMEWHAT DIFFICULT
3. WORRYING TOO MUCH ABOUT DIFFERENT THINGS: SEVERAL DAYS
6. BECOMING EASILY ANNOYED OR IRRITABLE: MORE THAN HALF THE DAYS
GAD7 TOTAL SCORE: 6

## 2022-04-15 ASSESSMENT — PATIENT HEALTH QUESTIONNAIRE - PHQ9
5. POOR APPETITE OR OVEREATING: SEVERAL DAYS
SUM OF ALL RESPONSES TO PHQ QUESTIONS 1-9: 1

## 2022-04-15 NOTE — PATIENT INSTRUCTIONS
Blood work here today    Continue to exercise regularly    Mention mom's history to Neurologist at next appt    Follow-up yearly, sooner if needed

## 2022-04-15 NOTE — NURSING NOTE
Chief Complaint   Patient presents with     Physical     Annual, fasting        Pre-visit Screening:  Immunizations:  up to date  Colonoscopy:  NA  Mammogram: NA  Asthma Action Test/Plan:  NA  PHQ9:  Given today   GAD7:  Given today  Questioned patient about current smoking habits Pt. has never smoked.  Ok to leave detailed message on voice mail for today's visit only Yes, phone # [phone]

## 2022-04-15 NOTE — PROGRESS NOTES
3  SUBJECTIVE:   CC: Ray Ingram is an 36 year old male who presents for preventive health visit.     Healthy Habits:  General health: good, Covid in Feb but fully recovered  Diet: pretty good  Exercise: 5-6 days/wk, running mostly  Sleep: follows with Neurology for headaches and sleep issues, nortriptyline has been helpful  Mental Health: some chronic anxiety but in a good place last couple years       Have you had an eye exam in the past two years? yes    Do you see a dentist twice per year? yes    Today's PHQ-2 Score:   PHQ-2 ( 1999 [employer]) 11/6/2019 10/20/2014   Q1: Little interest or pleasure in doing things 0 0   Q2: Feeling down, depressed or hopeless 0 0   PHQ-2 Score 0 0   PHQ-2 Total Score (12-17 Years)- Positive if 3 or more points; Administer PHQ-A if positive 0 -     Abuse: Current or Past(Physical, Sexual or Emotional)- No  Do you feel safe in your environment? Yes    Social History     Tobacco Use     Smoking status: Never Smoker     Smokeless tobacco: Never Used   Substance Use Topics     Alcohol use: Yes     Alcohol/week: 0.8 standard drinks     Types: 1 Standard drinks or equivalent per week     Comment: social     If you drink alcohol do you typically have >3 drinks per day or >7 drinks per week? No                      Last PSA: No results found for: PSA    Reviewed orders with patient. Reviewed health maintenance and updated orders accordingly - Yes  BP Readings from Last 3 Encounters:   04/15/22 118/84   11/06/19 110/76   06/21/18 110/76    Wt Readings from Last 3 Encounters:   04/15/22 59.9 kg (132 lb)   11/06/19 62.4 kg (137 lb 9.6 oz)   07/13/18 59.9 kg (132 lb)                  Current Outpatient Medications   Medication Sig Dispense Refill     naproxen (NAPROSYN) 500 MG tablet        nortriptyline (PAMELOR) 50 MG capsule TAKE 1 CAPSULE BY MOUTH EVERYDAY AT BEDTIME  4       Reviewed and updated as needed this visit by clinical staff   Tobacco   Meds                Reviewed and  "updated as needed this visit by Provider                   Past Medical History:   Diagnosis Date     NO ACTIVE PROBLEMS       Past Surgical History:   Procedure Laterality Date     HC TOOTH EXTRACTION W/FORCEP  16    wisdom       ROS:  12 point ROS performed and negative for new concerns except as mentioned above     OBJECTIVE:   /84 (BP Location: Left arm, Patient Position: Sitting, Cuff Size: Adult Large)   Pulse 88   Temp 98.1  F (36.7  C) (Temporal)   Resp 22   Ht 1.676 m (5' 6\")   Wt 59.9 kg (132 lb)   SpO2 98%   BMI 21.31 kg/m    EXAM:  GENERAL: healthy, alert and no distress  EYES: Eyes grossly normal to inspection, PERRL and conjunctivae and sclerae normal  HENT: ear canals and TM's normal, nose and mouth without ulcers or lesions  NECK: no adenopathy, no asymmetry, masses, or scars and thyroid normal to palpation  RESP: lungs clear to auscultation - no rales, rhonchi or wheezes  CV: regular rate and rhythm, normal S1 S2, no S3 or S4, no murmur, click or rub, no peripheral edema and peripheral pulses strong  ABDOMEN: soft, nontender, no hepatosplenomegaly, no masses and bowel sounds normal  MS: no gross musculoskeletal defects noted, no edema  SKIN: no suspicious lesions or rashes  NEURO: Normal strength and tone, mentation intact and speech normal  PSYCH: mentation appears normal, affect normal/bright    Diagnostic Test Results:  Labs reviewed in Epic    ASSESSMENT/PLAN:       ICD-10-CM    1. Annual physical exam  Z00.00    2. Screening for diabetes mellitus  Z13.1 Glucose Fasting ([facility])   3. Lipid screening  Z13.220 Lipid Panel ([facility])   4. Vitamin D deficiency  E55.9 VITAMIN D DEFICIENCY SCREENING ([facility])   5. Anxiety - controlled but given fam hx of thyroid disease will obtain tsh F41.9 TSH WITH FREE T4 REFLEX ([facility])       COUNSELING:  Reviewed preventive health counseling, as reflected in patient instructions       Regular exercise       Healthy diet/nutrition       Vision screening       " "Immunizations       Alcohol Use        Colorectal cancer screening    Estimated body mass index is 21.31 kg/m  as calculated from the following:    Height as of this encounter: 1.676 m (5' 6\").    Weight as of this encounter: 59.9 kg (132 lb).    He reports that he has never smoked. He has never used smokeless tobacco.      Manas Meyer MD, [facility]   "

## 2022-04-16 LAB
TSH SERPL-ACNC: 2.85 MIU/L (ref 0.4–4.5)
VITAMIN D, 25-OH, TOTAL - QUEST: 49 NG/ML (ref 30–100)

## 2022-04-16 ASSESSMENT — ANXIETY QUESTIONNAIRES: GAD7 TOTAL SCORE: 6

## 2022-10-09 ENCOUNTER — HEALTH MAINTENANCE LETTER (OUTPATIENT)
Age: 37
End: 2022-10-09

## 2023-04-21 ENCOUNTER — OFFICE VISIT (OUTPATIENT)
Dept: FAMILY MEDICINE | Facility: CLINIC | Age: 38
End: 2023-04-21

## 2023-04-21 VITALS
TEMPERATURE: 98 F | HEIGHT: 66 IN | WEIGHT: 129 LBS | RESPIRATION RATE: 20 BRPM | BODY MASS INDEX: 20.73 KG/M2 | DIASTOLIC BLOOD PRESSURE: 76 MMHG | SYSTOLIC BLOOD PRESSURE: 106 MMHG | OXYGEN SATURATION: 99 % | HEART RATE: 78 BPM

## 2023-04-21 DIAGNOSIS — Z13.220 LIPID SCREENING: ICD-10-CM

## 2023-04-21 DIAGNOSIS — E78.5 ELEVATED LIPIDS: ICD-10-CM

## 2023-04-21 DIAGNOSIS — Z00.00 ROUTINE PHYSICAL EXAMINATION: Primary | ICD-10-CM

## 2023-04-21 DIAGNOSIS — Z13.1 DIABETES MELLITUS SCREENING: ICD-10-CM

## 2023-04-21 DIAGNOSIS — Z83.49 FAMILY HISTORY OF THYROID DISEASE IN FATHER: ICD-10-CM

## 2023-04-21 LAB
BUN SERPL-MCNC: 12 MG/DL (ref 7–25)
BUN/CREATININE RATIO: 11.4 (ref 6–22)
CALCIUM SERPL-MCNC: 10 MG/DL (ref 8.6–10.3)
CHLORIDE SERPLBLD-SCNC: 103.8 MMOL/L (ref 98–110)
CHOLEST SERPL-MCNC: 181 MG/DL (ref 0–199)
CHOLEST/HDLC SERPL: 3 {RATIO} (ref 0–5)
CO2 SERPL-SCNC: 29.7 MMOL/L (ref 20–32)
CREAT SERPL-MCNC: 1.05 MG/DL (ref 0.6–1.3)
GLUCOSE SERPL-MCNC: 92 MG/DL (ref 60–99)
HDLC SERPL-MCNC: 60 MG/DL (ref 40–150)
LDLC SERPL CALC-MCNC: 107 MG/DL (ref 0–130)
POTASSIUM SERPL-SCNC: 5.27 MMOL/L (ref 3.5–5.3)
SODIUM SERPL-SCNC: 140.9 MMOL/L (ref 135–146)
TRIGL SERPL-MCNC: 72 MG/DL (ref 0–149)

## 2023-04-21 PROCEDURE — 80048 BASIC METABOLIC PNL TOTAL CA: CPT | Performed by: STUDENT IN AN ORGANIZED HEALTH CARE EDUCATION/TRAINING PROGRAM

## 2023-04-21 PROCEDURE — 80061 LIPID PANEL: CPT | Performed by: STUDENT IN AN ORGANIZED HEALTH CARE EDUCATION/TRAINING PROGRAM

## 2023-04-21 PROCEDURE — 99395 PREV VISIT EST AGE 18-39: CPT | Performed by: STUDENT IN AN ORGANIZED HEALTH CARE EDUCATION/TRAINING PROGRAM

## 2023-04-21 PROCEDURE — 36415 COLL VENOUS BLD VENIPUNCTURE: CPT | Performed by: STUDENT IN AN ORGANIZED HEALTH CARE EDUCATION/TRAINING PROGRAM

## 2023-04-21 NOTE — NURSING NOTE
Chief Complaint   Patient presents with     Physical     Annual, fasting      Pre-visit Screening:  Immunizations:  up to date  Colonoscopy:  NA  Mammogram: NA  Asthma Action Test/Plan:  NA  PHQ9:  PHQ2 done today   GAD7:  NA  Questioned patient about current smoking habits Pt. has never smoked.  Ok to leave detailed message on voice mail for today's visit only yes, phone # [phone]

## 2023-04-21 NOTE — PROGRESS NOTES
3  SUBJECTIVE:   CC: Ray Ingram is an 37 year old male who presents for preventive health visit.     Nursing Notes:   Dianne Mansfield CMA  4/21/2023  9:10 AM  Signed  Chief Complaint   Patient presents with     Physical     Annual, fasting      Pre-visit Screening:  Immunizations:  up to date  Colonoscopy:  NA  Mammogram: NA  Asthma Action Test/Plan:  NA  PHQ9:  PHQ2 done today   GAD7:  NA  Questioned patient about current smoking habits Pt. has never smoked.  Ok to leave detailed message on voice mail for today's visit only yes, phone # [phone]       Patient has been advised of split billing requirements and indicates understanding: Yes    Healthy Habits:    General health: feeling good    Diet: no concerns, trying to improve since lipids elevated last year    Exercise: active, running    Sleep: no concerns     Mental Health: good       Problems taking medications regularly No    Medication side effects: No    Have you had an eye exam in the past two years? yes    Do you see a dentist twice per year? yes    Do you have sleep apnea, excessive snoring or daytime drowsiness?no      Today's PHQ-2 Score:       4/21/2023     9:08 AM 11/6/2019     9:48 AM   PHQ-2 ( 1999 [employer])   Q1: Little interest or pleasure in doing things 0 0   Q2: Feeling down, depressed or hopeless 0 0   PHQ-2 Score 0 0   PHQ-2 Total Score (12-17 Years)- Positive if 3 or more points; Administer PHQ-A if positive  0       Do you feel safe in your environment? Yes        Social History     Tobacco Use     Smoking status: Never     Smokeless tobacco: Never   Vaping Use     Vaping status: Not on file   Substance Use Topics     Alcohol use: Yes     Alcohol/week: 0.8 standard drinks of alcohol     Types: 1 Standard drinks or equivalent per week     Comment: social     If you drink alcohol do you typically have >3 drinks per day or >7 drinks per week? No                      Last PSA: No results found for: PSA    Reviewed orders with patient.  "Reviewed health maintenance and updated orders accordingly - Yes  Lab work is in process  Labs reviewed in EPIC  BP Readings from Last 3 Encounters:   23 106/76   04/15/22 118/84   19 110/76    Wt Readings from Last 3 Encounters:   23 58.5 kg (129 lb)   04/15/22 59.9 kg (132 lb)   19 62.4 kg (137 lb 9.6 oz)                    Reviewed and updated as needed this visit by clinical staff   Tobacco  Allergies  Meds              Reviewed and updated as needed this visit by Provider                 Past Medical History:   Diagnosis Date     NO ACTIVE PROBLEMS       Past Surgical History:   Procedure Laterality Date     HC TOOTH EXTRACTION W/FORCEP  16    wisdom     Family History   Problem Relation Age of Onset     Cancer Father 52        Pancreatic cancer     Thyroid Disease Father      No Known Problems Brother      Alzheimer Disease Maternal Grandmother 60         age 70     Diabetes No family hx of      Heart Disease No family hx of      Coronary Artery Disease No family hx of      Cerebrovascular Disease No family hx of      Colon Cancer No family hx of      Prostate Cancer No family hx of        ROS:  12 point ROS performed and negative for new concerns except as mentioned above     OBJECTIVE:   /76 (BP Location: Left arm, Patient Position: Sitting, Cuff Size: Adult Large)   Pulse 78   Temp 98  F (36.7  C) (Temporal)   Resp 20   Ht 1.676 m (5' 6\")   Wt 58.5 kg (129 lb)   SpO2 99%   BMI 20.82 kg/m    EXAM:  GENERAL: healthy, alert and no distress  EYES: Eyes grossly normal to inspection, PERRL and conjunctivae and sclerae normal  HENT: ear canals and TM's normal, nose and mouth without ulcers or lesions  NECK: no adenopathy, no asymmetry, masses, or scars and thyroid normal to palpation  RESP: lungs clear to auscultation - no rales, rhonchi or wheezes  CV: regular rate and rhythm, normal S1 S2, no S3 or S4, no murmur, click or rub, no peripheral edema and peripheral pulses " "strong  ABDOMEN: soft, nontender, no hepatosplenomegaly, no masses and bowel sounds normal  MS: no gross musculoskeletal defects noted, no edema  SKIN: no suspicious lesions or rashes  NEURO: Normal strength and tone, mentation intact and speech normal  PSYCH: mentation appears normal, affect normal/bright    Diagnostic Test Results:  Labs reviewed in Epic    ASSESSMENT/PLAN:       ICD-10-CM    1. Routine physical examination  Z00.00       2. Family history of thyroid disease in father  Z83.49 TSH WITH FREE T4 REFLEX ([facility])      3. Lipid screening  Z13.220 Lipid Panel ([facility])      4. Diabetes mellitus screening  Z13.1 Basic Metabolic Panel ([facility])      5. Elevated lipids  E78.5 Basic Metabolic Panel ([facility])     Lipid Panel ([facility])           Patient has been advised of split billing requirements and indicates understanding: Yes  COUNSELING:  Reviewed preventive health counseling, as reflected in patient instructions       Regular exercise       Healthy diet/nutrition       Vision screening       Hearing screening       Immunizations       Alcohol Use        Colorectal cancer screening       Prostate cancer screening       Advance Care Planning    Estimated body mass index is 20.82 kg/m  as calculated from the following:    Height as of this encounter: 1.676 m (5' 6\").    Weight as of this encounter: 58.5 kg (129 lb).      He reports that he has never smoked. He has never used smokeless tobacco.    There are no Patient Instructions on file for this visit.   Manas Meyer MD, [facility]    "

## 2023-04-22 LAB — TSH SERPL-ACNC: 2.24 MIU/L (ref 0.4–4.5)

## 2024-05-25 ENCOUNTER — HEALTH MAINTENANCE LETTER (OUTPATIENT)
Age: 39
End: 2024-05-25